# Patient Record
Sex: FEMALE | Race: AMERICAN INDIAN OR ALASKA NATIVE
[De-identification: names, ages, dates, MRNs, and addresses within clinical notes are randomized per-mention and may not be internally consistent; named-entity substitution may affect disease eponyms.]

---

## 2018-06-24 ENCOUNTER — HOSPITAL ENCOUNTER (INPATIENT)
Dept: HOSPITAL 46 - ED | Age: 53
LOS: 3 days | Discharge: HOME | DRG: 603 | End: 2018-06-27
Attending: INTERNAL MEDICINE | Admitting: INTERNAL MEDICINE
Payer: COMMERCIAL

## 2018-06-24 VITALS — WEIGHT: 239 LBS | BODY MASS INDEX: 40.8 KG/M2 | HEIGHT: 64 IN

## 2018-06-24 DIAGNOSIS — L02.211: Primary | ICD-10-CM

## 2018-06-24 DIAGNOSIS — E11.65: ICD-10-CM

## 2018-06-24 DIAGNOSIS — E66.01: ICD-10-CM

## 2018-06-24 DIAGNOSIS — B95.62: ICD-10-CM

## 2018-06-24 DIAGNOSIS — L02.416: ICD-10-CM

## 2018-06-24 PROCEDURE — 0H97XZZ DRAINAGE OF ABDOMEN SKIN, EXTERNAL APPROACH: ICD-10-PCS | Performed by: SURGERY

## 2018-06-24 PROCEDURE — 0H98XZZ DRAINAGE OF BUTTOCK SKIN, EXTERNAL APPROACH: ICD-10-PCS | Performed by: SURGERY

## 2018-06-24 NOTE — NUR
PATIENT CALLED TO USE THE BATHROOM. WENT IN TO THE ROOM UNPLUG IV POLE,
PATIENT IS ALREADY PEEING AS WALKING TO THE TOILET. GAVE WET WIPES, CHANGED
GOWN AND GAVE NET UNDERWEAR AND PAD. PATIENT IS BACK IN BED, CALL LIGHT IN
REACH. CLEANED THE FLOOR WITH SANIWIPES.
PATIENT C/O ERIKA, RN EMERSON NOTIFIED.

## 2018-06-24 NOTE — NUR
PT TO FLOOR FROM EMERGENCY DEPARTMENT THIS AFTERNOON.  PT HAS DRESSING TO RLQ,
AND TO LEFT BUTTOCK, GAUZE WITH OPSITE COVERING.  PER REPORT FROM [facility]ER[facility] DEPARTMENT RN, RLQ WOUND WAS PACKED WITH 2 FEET OF IODEFORM, THEN
COVERED WITH GAUZE AND OPSITE.  BOTH SITES HAVE MODERATE AMOUNT
SEROSANGUAINOUS DRAINAGE SHOWING ON GAUZE, BUT DRESSINGS ARE INTACT, DRY.  PT
GIVEN NORCO 10/325 MG 1 TAB PO PRN C/O 3/10 PAIN TO LEFT BUTTOCK WOUND.
PT ON RA, LUNGS CTA, HRR.  BOWEL TONES ACTIVE X 4 QUADRANTS, PT REPORTS HAVING
A BM THIS AM.  PT ON 1800 CALORIE ADA DIET.  APETITE GOOD.  PT UP WITH STANDBY
ASSIST.  HAS 1/2 NS INFUSING  ML/HR.

## 2018-06-24 NOTE — NUR
PT TO FLOOR FROM EMERGENCY DEPARTMENT AT 1538 VIA WHEELCHAIR, ACCOMPANIED BY
APRIL MCKAY.  PT TRANSFERED TO BED.  C/O 3/10 PAIN TO LEFT BUTTOCK WOUND AREA.
GAVE NORCO 10/325 MG PO PRN, WITH CRACKERS AND WATER.  PT DENIES NAUSEA.  CALL
BUTTON AND PERSONAL SUPPLIES IN REACH.

## 2018-06-24 NOTE — NUR
Up to brp, voided dark yellow urine, back to bed, toleratd well, one person
assist. CBG 3065 received 7 units Lispro Insulin. teaching done, receptive,
continue with diabetic teaching

## 2018-06-24 NOTE — NUR
GAVE PT PRINTED AND VERBAL EDUCATION REGARDING TYPE 2 DM, DIABETIC NUTRITION,
AND SIGNS AND SYMPTOMS OF HYPERGLYCEMIA OR HYPOGLYCEMIA.  PT VERBALIZED
UNDERSTANDING.  LEFT PRINTED EDUCATION PACKET WITH PT FOR FURTHER REVIEW.

## 2018-06-24 NOTE — NUR
ASSISTED PT IN ORDERING DINNER.  PT REPORTED THAT SHE HAD NO PAIN.  DENIED
OTHER NEEDS.  PT HAS PERSONAL SUPPLIES AND CALL LIGHT IN REACH.

## 2018-06-25 NOTE — NUR
PT IN BED.  ATE 70% OF LUNCH.  DENIES NAUSEA.  DENIES PAIN.  PERSONAL SUPPLIES
AND CALL LIGHT IN REACH.

## 2018-06-25 NOTE — NUR
RN IN ROOM WITH PATIENT. PATIENT SITTING UP IN BED, CALL LIGHT IN REACH. WASH
CLOTH SET OUT FOR PATIENT TO USE AT A LATER TIME. NO OTHER NEEDS AT THIS TIME.

## 2018-06-25 NOTE — NUR
PT IN BED, AWAKE, ALERT.  REPORTED THAT SHE TOOK A SHOWER AFTER DR. LEWIS
REMOVED HER WOUND DRESSINGS, AND ALLOWED WATER TO FLOW OVER WOUNDS.  APPLIED
DRY 4X4 GAUZE AND PAPER TAPE TO RLQ ABD WOUND, WOUND TO BUTTOCK HAS DRY 4X4
GAUZE IN PLACE HELD BY MESH PANTIES.  PT DENIED PAIN.  PT HAD MODERATE AMOUNT
OF SEROSANGUAINOUS DRAINAGE TO GOWN, SHEET.  LINNENS AND GOWN CHANGED.  PT
SITTING UP IN RECLINER.  PT ALONE IN ROOM.

## 2018-06-25 NOTE — NUR
medicated with 1 Norco 10/325mg per c/o 3/10 head and neck pain. Ice packs to
back of head given too. pt up to brp, voided, back to bed tolerated well. no
c/o adverse reaction to abx , IVF infusing w/o problems. Abd dressing with old
drainage, covered with opsite, and buttocks dressing with scant ss drainage
left buttocks

## 2018-06-25 NOTE — NUR
PT IS RESTING WITH EYES CLOSED, RESPIRATIONS ARE EVEN AND NONLABORED ON 2LNC.
CALL LIGHT IS WITHIN REACH AND THERE ARE VISITORS IN THE ROOM.

## 2018-06-25 NOTE — NUR
PATIENT RESTING IN BEDSIDE RECLINER. LINENS CHANGED. PATIENT EATING DINNER,
CALL LIGHT IN REACH. NO OTHER NEEDS AT THIS TIME.

## 2018-06-25 NOTE — NUR
PT C/O 3/10 PAIN TO RLQ WOUND AREA.  GAVE NORCO 10/325 MG PO PRN.  PT IN BED.
PERSONAL SUPPLIES AND CALL LIGHT IN REACH.  PT DENIED OTHER NEEDS.  BG
CHECKED, 334.

## 2018-06-25 NOTE — NUR
1655 WOUND OUTLINED WITH SURGICAL MARKER; DSG APPLIED TO WOUND ON L BUTTOCK
AND COVERED WITH DISPOSABLE UNDIES. IVF RESTARTED

## 2018-06-25 NOTE — NUR
ASSESSED PT AND ADMINISTERED MEDICATIONS. BS  AND 11 UNITS OF HUMALOG
WAS GIVEN. PT'S IV IS INFILTRATED WILL START A NEW ONE AND RESTART ABX.

## 2018-06-25 NOTE — NUR
Resting, eyes closed, no s/sx pain, IVF infusing w/o problems, no c/o
hypoglycimia or hyperglycemia s/sx. Call light at hands reach

## 2018-06-25 NOTE — NUR
PT CURRENTLY IN BED, EYES CLOSED, NO RESP DISTRESS. HAS BEEN UP TO BRP WITH
ONE ASSIST, IVF INFUSING W/O PROBLEMS, NO C/O ADVERSE REACTION TO ABX.
R ABD DRESSING WITH OLD SS DRAINAGE COVERED WT OPSITE. DRESSING R BUTTOCKS
INTACT WITH OLD DRAINAGE. HAS BEEN MEDICATED WT NORCO 10/325 ONE TAB PER C/O
H/A, RELIEF STATED. ICE TO BACK OF HEAD EFFECTIVE TOO. DIABETIC TEACHING GIVEN
VERBALLY AND WRITTEN, RECEPTIVE, CONTINUE TO REINFORCE TEACHING,  LAST
NIGHT, RECEIVED 7 UNITS SC INSULIN. FAMILY IN ROOM

## 2018-06-25 NOTE — NUR
PT IN BED, AWAKE, ALERT.  DENIED PAIN.  RECIEVED BEDSIDE REPORT FROM APRIL NORMAN.  PROVIDED PT WITH NUTRITION EDUCATION, AND EDUCATION REGARDING ROOM
SERVICE ORDERING.  PT STATED THAT SHE FELT COMFORTABLE ORDERING VIA ROOM
SERVICE.  PERSONAL SUPPLIES AND CALL LIGHT IN REACH.  PT DENIED NEEDS AT THIS
TIME.

## 2018-06-25 NOTE — NUR
PATIENT RESTING IN BED, CALL LIGHT IN REACH. DAUGHTER IN ROOM. PATIENT STATES
SHE WASHED HER HAIR IN THE BATHROOM EARLY. NO OTHER NEEDS AT THIS TIME.

## 2018-06-25 NOTE — NUR
PT ATE 75% OF BREAKFAST.  DENIED PAIN AT THIS TIME.  RIGHT LOWER QUADRANT
WOUND DRESSING HAS MODERATE AMOUNT SEROSANGUAINOUS DRAINAGE TO GAUZE, NO
DRAINAGE OUT OF OPSITE.  MARILEE WOUND AREA RED, HOT, SWOLLEN, NO CHANGE IN THIS
FROM YESTERDAY NOTED.  LEFT BUTTOCK WOUND DRESSING HAS MODERATE AMOUNT
SEROSANGUAINOUS DRAINAGE TO GAUZE NOTED, WITH NO DRAINAGE OUT OF OPSITE NOTED.
 PT HAS PERSONAL SUPPLIES AND CALL LIGHT IN REACH.  IV CIPROFLOXACIN INFUSING
AS ORDERED.

## 2018-06-25 NOTE — NUR
PT FOLLOWING ADA DIET, RECIEVED VERBAL, VISUAL, AND PRINTED EDUCATION
REGARDING DIABETES, DIABETIC MANAGEMENT.  PT ALERT, ORIENTED X 4.  PT REPORTED
PAIN ONCE THIS SHIFT, 3/10 TO WOUND, NORCO 10/325 MG 1 TAB GIVEN, COVERED PAIN
EFFECTIVELY PER PT.  DR. LEWIS IN TO SEE PT, REMOVED OLD DRESSINGS FROM RLQ
ABD, AND LEFT BUTTOCK WOUND.  PT SHOWERED WITH WOUNDS SHARLENE, ALLOWING WATER TO
FLOW OVER WOUNDS.  ABD AND LEFT BUTTOCK WOUNDS HAD MODERATE AMOUNT OF
SEROSANGUAINOUS DRAINAGE.  COVERED WITH DRY GAUZE AND PAPER TAPE DRESSINGS.
PT ON RA, LUNGS CTA, DIMINISHED IN BASES.  ENCOUARGED DB&C.

## 2018-06-26 NOTE — NUR
ENTERED PT'S ROOM D/T ALARMING IV. OCCLUSION FIXED, FRESH WATER AT BEDSIDE. PT
DENIES FURTHER NEEDS AT THIS TIME. CALL LIGHT IS WITHIN REACH.

## 2018-06-26 NOTE — NUR
BREAKFAST ARRIVED. PT EATING. INSULIN GIVEN AS ORDERED. PT REQUESTS ASSISTANCE
UP TO SHOWER. CNA NOTIFIED. PIV SALINE LOCKED. CALL LIGHT WIHIN EREN.

## 2018-06-26 NOTE — NUR
WOKE PT TO GIVE IV ABX, PT IS BACK TO SLEEP AT THIS TIME. RESPIRATIONS ARE
EVEN AND NONLABORED. CALL LIGHT IS WITHIN REACH.

## 2018-06-26 NOTE — NUR
PT FINISHED WITH SHOWER. MORNING ASSESSMENT AND MEDICATIONS DUE. THIS RN TO
BEDSIDE. ASSESSMENT DONE. MEDICAITONS GIVEN (SEE MAR). PT RESTING IN CHAIR.
CALL LIGHT WITHIN REACH. PT STATES SHE HAS NO ADDITIONAL REQUESTS OR
COMPLAINTS AT THIS TIME.

## 2018-06-26 NOTE — NUR
AFTERNOON ASSESSMENT AND BLOOD SURGAR DUE. THIS RN TO BEDSIDE. PT UP TO CHAIR.
PT REPORTS SOME NAUSEA THAT IS "ANNOYING." PT DENIES PAIN. PT ASSISTED TO TAKE
HER OWN BLOOD SUGAR. PT DEMONSTRATES PROPER TECHNIQUE. ASSESSMENT DONE.
DRESSINS INTACT, MODERATE AMOUNT OF SHADOWING NOTED. MD CONTACTED REGARDING
NAUSEA. ORDER PLACED. THIS RN RETURNS TO ROOM. MEDICATION GIVEN (SEE MAR). PT
UP TO RESTROOM INDEPENDANTLY. DIET 7-UP PROVIDED PER PT REQUEST. PT STATES SHE
HAS NO ADDITIONAL REQUESTS OR COMPLAINTS AT THIS TIME. CALL LIGHT WITHIN
REACH.

## 2018-06-26 NOTE — NUR
PT HERE FOR RLQ AND GLUTEAL ABCESS. PT NEWLY DIAGNOSED WITH DM II. INDEPENDANT
IN ROOM, ADA DIET. EDUCATION DONE TODAY REGARDING ADA DIET, BLOOD SUGAR
CHECKS, AND INSULIN ADMINISTARTION. PT ABLE TO DEMONSTART PROPER TECHNIQUE FOR
THESE ACTIVITIES. ONE EPISODE OF NAUSEA TODAY. PRN ZOFRAN ORDERED AND
GIVEN. SHOWER TODAY, GAUZE DRESSING CHANGES TO ABCESSES, GLUTEAL AND RLQ. PT
USING CALL LIGHT APPROPRIATLY.

## 2018-06-26 NOTE — CONS
[facility]
                                    [address], [place]  [address]
_________________________________________________________________________________________
                                                                 Signed   
 
 
DATE OF CONSULTATION:
06/25/2018
 
CONSULTING PHYSICIAN:
Dudley Lewis MD.
 
REQUESTING PHYSICIANS:
Anna Marie Pitt MD and Dr. Oleksandr Ansari.
 
PROBLEM:
Emergency room drainage of right abdominal wall abscess and left gluteal abscess (Dr. Ansari in the emergency room) newly discovered diabetes out of control. 
 
HISTORY OF PRESENT ILLNESS:
This 53-year-old obese Palestinian woman presented to the emergency room with painful abscess
of the right lower abdominal wall as well as left buttock area.  It was really in the
left lateral hip actually.  She had had chills, but no fever.  She never had such
infections in the past.  She was not aware that she was diabetic, but she was found
during emergency room evaluation have elevated glucose level of 478.  Her white count
was 11.9.  Chem profile was otherwise reasonably normal, though sodium was slightly low
at 130.  Creatinine was 0.75. 
 
Review of Dr. Ansari's note and my recollection of our discussion late yesterday had
demonstrated a large ulcerating abscess of the right anterior abdomen with mild
fluctuance.  There is perilesional cellulitis.  In the lateral posterior leg and the
inferior gluteal area, there is a large focal abscess with central ulceration and
minimal cellulitis.  Drainage of the anterior abdominal wall lesion showed copious
amounts of purulent material which was cultured and irrigated with 300 mL of saline with
Betadine and 0.5 inch packing was placed.  An OpSite was applied.  The left posterior
thigh was similarly treated.  One foot of packing was applied to that area. 
 
The patient was admitted to Dr. Pitt for continued management of her newly diagnosed
diabetes. 
 
Review of her medications shows that she is on vancomycin intravenously administered as
well as insulin.  Flagyl and Cipro were additionally given and have been maintained.
Microbiology showed multiple gram-positive cocci not otherwise characterized at this
time. 
 
PHYSICAL EXAMINATION:
GENERAL:  This is an obese Palestinian woman with her approximately 11-year-old daughter
lying in the bed next to her.  She appears nontoxic. 
HEENT:  Mucous membranes are slightly dry.  Trachea is midline. 
CHEST:  Clear. 
 
    Electronically Signed By: DUDLEY LEWIS MD  06/26/18 1902
_________________________________________________________________________________________
PATIENT NAME:     ROSY CALZADA               
MEDICAL RECORD #: [id number]            CONSULTATION                  
          ACCT #: [id number]  
DATE OF BIRTH:   [birth year]            REPORT #: [id number]      
PHYSICIAN:        DUDLEY LEWIS MD                 
PCP:              [facility]             
REPORT IS CONFIDENTIAL AND NOT TO BE RELEASED WITHOUT AUTHORIZATION
 
 
                                  [facility]
                                    [address], [place]  [address]
_________________________________________________________________________________________
                                                                 Signed   
 
 
HEART:  Regular. 
ABDOMEN:  Markedly obese.  Right lower abdominal wall is a markedly cellulitic area of
fatty tissue with an OpSite adherent to an underlying gauze.  The OpSite and gauze were
removed.  The site of drainage is well demonstrated.  I do not detect ongoing purulent
drainage.  There is no significant tenderness to palpation. 
 
On the left lateral gluteal area, a much smaller lesion similarly treated and dressed
was noted.  The gauze was not removed at this time in either site. 
 
LABORATORY DATA:
Review of laboratory studies showed yesterday's white count to be 11.9, today 13.0.
Chem profile today shows creatinine of 0.59. 
 
Toxicology report showed vancomycin level not yet received.
 
ASSESSMENT:
She has a considerable amount of cellulitis related to the right lower abdominal wall,
recent abscess drainage in the emergency room setting.  Although I do not see signs of
un-drained purulence, the gauze packing remains in place at this time.  Ultimately, it
would be removed and plain water allowed to the area.  My usual approach would have been
to place a loop drain to allow for continued drainage, but the packing will suffice to
allow for drainage as well though it may need to be removed and additional packing
placed temporarily. 
 
The gluteal lesion is not nearly as cellulitic in appearance and appears to be doing
well. 
 
She is not systemically toxic, however, her white count has increased since admission,
not decreased.  Blood sugars remain in the mid to 100 levels with ongoing insulin
therapy. 
 
I will review with Dr. Pitt course of therapy, but for now allow showering to both
areas in question.  Removal of the packing may be advisable no later than tomorrow.  If
she shows no sign of improvement of her cellulitic process of the abdominal wall, she
may require additional exam under anesthesia with more vigorous opening of what may be a
complex abdominal wall infection.  Continued antibiotic use is quite important,
particularly given her underlying diabetes. 
 
 
 
            ________________________________________
            Dudley Lewis MD 
 
 
 
    Electronically Signed By: DUDLEY LEWIS MD  06/26/18 1902
_________________________________________________________________________________________
PATIENT NAME:     ROSY CALZADA               
MEDICAL RECORD #: [id number]            CONSULTATION                  
          ACCT #: [id number]  
DATE OF BIRTH:   [birth year]            REPORT #: [id number]      
PHYSICIAN:        DUDLEY LEWIS MD                 
PCP:              [facility]             
REPORT IS CONFIDENTIAL AND NOT TO BE RELEASED WITHOUT AUTHORIZATION
 
 
                                  [address]  [address]
_________________________________________________________________________________________
                                                                 Signed   
 
 
/[facility]
Job #:  [id number]
DD:  06/25/2018 15:53:05
DT:  06/25/2018 22:33:43
 
cc:            [facility] 
 
               Oleksandr Pitt MD
 
 
Copies:  [facility]
         OLEKSANDR ANSARI CYNTHIA MD
~
 
 
 
 
 
 
 
 
 
 
 
 
 
 
 
 
 
 
 
 
 
 
 
 
 
 
 
 
 
    Electronically Signed By: DUDLEY LEWIS MD  06/26/18 1902
_________________________________________________________________________________________
PATIENT NAME:     ELSIROSYVIKAS MAYA               
MEDICAL RECORD #: [id number]            CONSULTATION                  
          ACCT #: [id number]  
DATE OF BIRTH:   [birth year]            REPORT #: [id number]      
PHYSICIAN:        DUDLEY LEWIS MD                 
PCP:              [facility]             
REPORT IS CONFIDENTIAL AND NOT TO BE RELEASED WITHOUT AUTHORIZATION Patient may resume coumadin in 72 hours, may resume Lovenox bridge this evening. Gordo Rodriguez MD  [facility] [address] - [phone]  [email or web address]

## 2018-06-26 NOTE — NUR
IN ROOM TO ASSESS PT AND ADMINISTER MEDICATIONS. PT STATES SHE HAS A HEADACHE
AT THIS TIME AND TYLENOL WAS ADMINISTERED. GAUZE DRESSINGS WERE CHANGED WITH
MODERATE AMT OF SEROSANGUINOUS DRAINIAGE NOTED. NEW GAUZE IS APPLIED AND NO
TAPE IN PLACE THEY ARE HELD WITH MESH PANTIES. PT IS WORRIED ABOUT THE GAUZE
FALLING OFF WHEN SHE USES THE RESTROOM. ADVISED PT TO CALL IF SHE NEEDS HELP
AND ALSO LEFT GAUZE AT BEDSIDE IF SHE NEEDS TO REPLACE IT. CALL LIGHT IS
WITHIN REACH. PT DENIES FURTHER NEEDS.

## 2018-06-26 NOTE — NUR
WOUNDS HAVE GAUZE IN PLACE WITH SEROSANGUINOUS DRAINAGE NOTED. PT REQUIRED
NORCO AT 2054 LAST NIGHT TO "STAY AHEAD OF THE PAIN". BS  AT HS AND
REQUIRED 11 UNITS OF HUMALOG, CONTINUE DIABETIC EDUCATION. IV FLAGYL, CIPRO
AND VANCO ARE STILL BEING ORDERED. PT'S IV INFILTRATED AND NEW IV IS IN LEFT
FOREARM. PT IS ON CONTACT PRECAUTIONS WHILE WOUND CULTURE IS PENDING.

## 2018-06-26 NOTE — NUR
PT CALL LIGHT ON. PUMP ALARMING, INFUSION AND FLUSH COMPLETE. CBG TAKEN.
FOCUSSESSED ASSESSMENT DONE. MEDICATIONS GIVEN (SEE MAR). PT DENIES PAIN AND
NAUSEA. PT HAS QUESTIONS ABOUT FOODS SHE CAN EAT AS A DIABETIC. QUESTIONS
ANSWERED. PT ASSISTED WITH ORDERING LUNCH. PEANUT BUTTER SNACK PROVIDED AS
REQUESTED. CALL LIGHT WITHIN REACH. PT WATCHING TV AND EATING LUNCH.

## 2018-06-27 NOTE — NUR
PATIENT IS NEWLY DIAGNOSED WITH TYPE 2 DM.  SHE LIVES WITH HER DAUGHTER, HER
DAUGHTER DOES MOST OF THE COOKING.  HER DAUGHTER WAS NOT HERE AT 0925 WHEN I
WAS IN THE ROOM.  I ASKED PATIENT IF SHE CAN TELL ME 3 CARBOHYDRATE FOODS.
SHE SAID NO.  I STARTED OFF EXPLAINING FOODS/DRINKS THAT HAVE ADDED SUGAR WILL
RAISE THE BLOOD SUGAR THE MOST.  FOODS WITH FIBER AND LITTLE OR NO ADDED SUGAR
ARE BEST.  FOOD EXAMPLES PROVIDED.  EXPLAINED THE PLATE METHOD WITH PLATE
PICTURES.  NON-STARCHY VEGGIE LIST PROVIDED.  PATIENT DOES EAT CHICKEN, THIN
CUT PORK CHOPS, EGGS, SOME HAMBURGER, FISH ON OCCASION FOR PROTEIN.  I
PROVIDED PRINTOUTS FROM THE [employer] ON CARBOHYDRATES AND
MEAL IDEAS. I ALSO REMINDED HER TO NOT SKIP MEALS.  SHE SHOULD EAT EVERY 3-4
HOURS OR SO TO PREVENT HYPOGLYCEMIA.  PATIENT WOULD BENEFIT FROM CLASSES OR
ONE ON ONE EDUCATION SINCE THIS IS ALL NEW TO HER.

## 2018-06-27 NOTE — NUR
PT HAS FRESH GAUZE APPLIED TO GLUTEAL ABCESS, SHE DENIES PAIN AT THIS TIME.
CLINDAMYCIN IS INFUSING. PT DENIES FURTHER NEEDS.

## 2018-06-27 NOTE — NUR
PT UP IN BED EATING LUNCH. AGAIN DEMONSTRATED ABILITY TO PROPERLY SELF INJECT
INSULIN PRIOR TO MEAL. DENIES NEEDS OR CONCERNS AT THIS TIME. CALL LIGHT
WITHIN REACH.

## 2018-06-27 NOTE — NUR
CALLED  TO REPORT PT REQUESTS INSULIN PEN INSTEAD OF INSULIN VIAL.
 SAID THT IF HER INSURANCE WILL FILL FOR THE PEN OK TO DO THIS AT
EQUIVALENT DOSE. JOVON PHARMACIST CALLING [facility] TO INVESTIGATE
IF COVERED AT THIS TIME

## 2018-06-27 NOTE — NUR
PT UP AMB TO RESTROOM INDEPENDENTLY UPON THIS RN ENTERING ROOM. PT CHANGED
GAUZE DRESSINGS ON ABCESS TO RLQ AND L BUTTOCK, SMALL TO MODERATE AMOUNT OF
SEROSANGUINOUS DRAINAGE. PT DENIES PAIN OR NAUSEA AT THIS TIME. IV FLUSHES
WELL, AM ABX INFUSING. PT EATING BREAKFAST, CHRISTINA WELL. PT DEMONSTRATED ABILITY
TO GIVE INSULIN INJECTION INDEPENDENTLY.

## 2018-06-27 NOTE — NUR
PT IS INDEPENDENT IN ROOM. MESH PANTIES KEEP GAUZE TO RLQ ABCESS AND LEFT
GLUTEAL AREA ABCESS. GAUZE IS AT BEDSIDE INCASE IT FALLS OFF WHEN PT USES
RESTROOM. ADA DIET AND ACCUCHECKS ACHS. BS AT HS  REQUIRING 11 UNITS OF
HUMALOG. SHE RECEIVES CLINDAMYCIN Q6H.

## 2019-09-26 NOTE — NUR
IV SL AFTER ABX INFUSION. PT UP TO SHOWER INDEPENDENTLY, LINENS CHANGED. Infliximab Counseling:  I discussed with the patient the risks of infliximab including but not limited to myelosuppression, immunosuppression, autoimmune hepatitis, demyelinating diseases, lymphoma, and serious infections.  The patient understands that monitoring is required including a PPD at baseline and must alert us or the primary physician if symptoms of infection or other concerning signs are noted.

## 2023-10-10 NOTE — NUR
JOSÉ LOPEZ EDUCATOR IN PT ROOM DISCUSSING NEW DIAGNOSIS OF DIABETES. MEDICATIONS  (STANDING):  haloperidol     Tablet 10 milliGRAM(s) Oral two times a day    MEDICATIONS  (PRN):  acetaminophen     Tablet .. 650 milliGRAM(s) Oral every 6 hours PRN Mild Pain (1 - 3)  diphenhydrAMINE 50 milliGRAM(s) Oral every 6 hours PRN Agitation or anxiety not responding to reassurance  diphenhydrAMINE 50 milliGRAM(s) Oral once PRN agitation  haloperidol     Tablet 5 milliGRAM(s) Oral every 6 hours PRN psychotic agitation  LORazepam     Tablet 2 milliGRAM(s) Oral every 6 hours PRN Agitation or anxiety not responding to reassurance